# Patient Record
Sex: FEMALE | Race: WHITE | NOT HISPANIC OR LATINO | Employment: UNEMPLOYED | URBAN - METROPOLITAN AREA
[De-identification: names, ages, dates, MRNs, and addresses within clinical notes are randomized per-mention and may not be internally consistent; named-entity substitution may affect disease eponyms.]

---

## 2017-03-01 ENCOUNTER — TELEPHONE (OUTPATIENT)
Dept: NEUROLOGY | Facility: CLINIC | Age: 22
End: 2017-03-01

## 2017-03-01 ENCOUNTER — OFFICE VISIT (OUTPATIENT)
Dept: NEUROLOGY | Facility: CLINIC | Age: 22
End: 2017-03-01
Payer: MEDICAID

## 2017-03-01 VITALS
RESPIRATION RATE: 18 BRPM | HEIGHT: 63 IN | DIASTOLIC BLOOD PRESSURE: 70 MMHG | SYSTOLIC BLOOD PRESSURE: 129 MMHG | BODY MASS INDEX: 27.23 KG/M2 | HEART RATE: 102 BPM | WEIGHT: 153.69 LBS

## 2017-03-01 DIAGNOSIS — R56.9 SEIZURE: Primary | ICD-10-CM

## 2017-03-01 DIAGNOSIS — R79.89 ABNORMAL LFTS: ICD-10-CM

## 2017-03-01 PROCEDURE — 99999 PR PBB SHADOW E&M-EST. PATIENT-LVL III: CPT | Mod: PBBFAC,,, | Performed by: PSYCHIATRY & NEUROLOGY

## 2017-03-01 PROCEDURE — 99215 OFFICE O/P EST HI 40 MIN: CPT | Mod: S$PBB,,, | Performed by: PSYCHIATRY & NEUROLOGY

## 2017-03-01 PROCEDURE — 99213 OFFICE O/P EST LOW 20 MIN: CPT | Mod: PBBFAC,PN | Performed by: PSYCHIATRY & NEUROLOGY

## 2017-03-01 RX ORDER — MEDROXYPROGESTERONE ACETATE 150 MG/ML
INJECTION, SUSPENSION INTRAMUSCULAR
Refills: 3 | COMMUNITY
Start: 2017-01-25 | End: 2020-02-14 | Stop reason: CLARIF

## 2017-03-01 RX ORDER — LEVETIRACETAM 500 MG/1
500 TABLET ORAL 2 TIMES DAILY
Qty: 60 TABLET | Refills: 11 | Status: SHIPPED | OUTPATIENT
Start: 2017-03-01 | End: 2020-02-14 | Stop reason: CLARIF

## 2017-03-01 NOTE — MR AVS SNAPSHOT
[facility] Neurology  [address]  [facility] [address]  Phone: [phone]  Fax: [phone]                  Guadalupe Aron   3/1/2017 10:40 AM   Office Visit    Description:  Female : 1995   Provider:  Remy Quintanilla Jr., MD   Department:  [place] - Neurology           Reason for Visit     Seizures           Diagnoses this Visit        Comments    Seizure    -  Primary     Abnormal LFTs                To Do List           Future Appointments        Provider Department Dept Phone    3/3/2017 9:35 AM LAB, [place] [facility] [phone]      Goals (5 Years of Data)     None       These Medications        Disp Refills Start End    levetiracetam (KEPPRA) 500 MG Tab 60 tablet 11 3/1/2017     Take 1 tablet (500 mg total) by mouth 2 (two) times daily. - Oral    [facility] [address] [place] [address] [facility] [address] Ph #: [phone]         [facility]     [facility] -  Assistance  Registered nurses in the [facility] provide clinical advisement, health education, appointment booking, and other advisory services.  Call for this free service at [phone].             Medications           Message regarding Medications     Verify the changes and/or additions to your medication regime listed below are the same as discussed with your clinician today.  If any of these changes or additions are incorrect, please notify your healthcare provider.        START taking these NEW medications        Refills    levetiracetam (KEPPRA) 500 MG Tab 11    Sig: Take 1 tablet (500 mg total) by mouth 2 (two) times daily.    Class: Normal    Route: Oral      STOP taking these medications     valproic acid (DEPAKENE) 250 mg capsule Take 2 capsules (500 mg total) by mouth 2 (two) times daily.           Verify that the below list of medications is an accurate representation of the medications you are  currently taking.  If none reported, the list may be blank. If incorrect, please contact your healthcare provider. Carry this list with you in case of emergency.           Current Medications     medroxyPROGESTERone (DEPO-PROVERA) 150 mg/mL Syrg ADM 1 ML IM 1 TIME Q 3 MONTHS    nicotine (NICODERM CQ) 21 mg/24 hr Place 1 patch onto the skin once daily.    levetiracetam (KEPPRA) 500 MG Tab Take 1 tablet (500 mg total) by mouth 2 (two) times daily.           Clinical Reference Information           Your Vitals Were     BP                   129/70 (BP Location: Left arm, Patient Position: Sitting, BP Method: Automatic)           Blood Pressure          Most Recent Value    BP  129/70      Allergies as of 3/1/2017     No Known Allergies      Immunizations Administered on Date of Encounter - 3/1/2017     None      Orders Placed During Today's Visit     Future Labs/Procedures Expected by Expires    CBC auto differential  3/1/2017 4/30/2018    Comprehensive metabolic panel  3/1/2017 4/30/2018      [facility] Sign-Up     Activating your [facility] account is as easy as 1-2-3!     1) Visit [email or web address], select Sign Up Now, enter this activation code and your date of birth, then select Next.  [email or web address]  Expires: 4/15/2017 10:25 AM      2) Create a username and password to use when you visit [facility] in the future and select a security question in case you lose your password and select Next.    3) Enter your e-mail address and click Sign Up!    Additional Information  If you have questions, please e-mail [email or web address] or call [phone] to talk to our [facility] staff. Remember, [facility] is NOT to be used for urgent needs. For medical emergencies, dial 911.         Smoking Cessation     If you would like to quit smoking:   You may be eligible for free services if you are a [place] resident and started smoking cigarettes before September 1, 1988.  Call the Smoking Cessation Trust (SCT) toll free at (063)  687-2559 or (801) 605-7051.   Call 1-800-QUIT-NOW if you do not meet the above criteria.            Language Assistance Services     ATTENTION: Language assistance services are available, free of charge. Please call [phone].      ATENCIÓN: Si habla dignaañol, tiene a quijano disposición servicios gratuitos de asistencia lingüística. Llame al [phone].     CHÚ Ý: N?u b?n nói Ti?ng Vi?t, có các d?ch v? h? tr? ngôn ng? mi?n phí dành cho b?n. G?i s? [phone].         [facility] complies with applicable Federal civil rights laws and does not discriminate on the basis of race, color, national origin, age, disability, or sex.

## 2017-03-01 NOTE — PROGRESS NOTES
"Date of service:  3/1/2017    Chief complaint:  Seizures    History of present illness:  The patient is a 21 y.o. female seen in the hospital by Dr. Solis for seizures.  This is my first time seeing her.      The patient herself has no memory of the event.  The patient's seizures occurred in late December (9 PM on  and 4 AM ).  She reports no aura.  She was told that she had generalized shaking with unresponsiveness.  It is not clear how long this lasted for.  Afterwards, she reports that she did not wake up for a period of time.  The patient has had 2 such events.    She reports that she has had episodes where she stares and is apparently unresponsive for a few seconds. These happen daily.    She also indicates that she has brief monophasic jerks.  She has not noticed that there is any particular predilection for a given time of the day in which these occur.  These also happen daily.     The patient has no known family history of seizures; however, she is adopted and does not know anything of her biological family's medical history.  She reports no known history of prenatal/ complications. There is no known history of febrile seizures.  She notes no history of CNS infections. She claims a history of significant head trauma resulting in a skull fracture and a "bruise on my brain."  There is no history of developmental delay.    Also of note, she is moving to [place] in less than a week.    Current AEDs:  None    Prior AEDs:  Depakote (stopped without discussing with a physician due to insomnia and hair loss)      Past Medical History:   Diagnosis Date    Seizures        Past surgical History:  Denies     Family History:  Adopted, no knowledge of biological family      Social History     Social History    Marital status: Single     Spouse name: N/A    Number of children: N/A    Years of education: N/A     Occupational History    Not on file.     Social History Main Topics    Smoking status: " "Current Every Day Smoker     Packs/day: 0.50     Types: Cigarettes    Smokeless tobacco: Not on file    Alcohol use No    Drug use: Yes     Special: Marijuana    Sexual activity: Not on file     Other Topics Concern    Not on file     Social History Narrative        Review of Systems  General/Constitutional:  No unintentional weight loss, + change in appetite  Eyes/Vision:  No change in vision, No double vision  ENT:  No frequent nose bleeds, + ringing in the ears  Respiratory:  No cough, No wheezing  Cardiovascular:  No chest pain, No palpitations  Gastrointestinal:  No jaundice, No nausea/vomiting  Genitourinary:  No incontinence, No burning with urination  Hematologic/Lymphatic:  + easy bruising/bleeding, No night sweats  Neurological:  No numbness, No weakness  Endocrine:  No fatigue, No heat/cold intolerance  Allergy/Immunologic:  No fevers, No chills  Musculoskeletal:  No muscle pain, No joint pain   Psychiatric:  No thoughts of harming self/others, No depression  Integumentary:  No rashes, No sores that do not heal     Physical exam:  /70 (BP Location: Left arm, Patient Position: Sitting, BP Method: Automatic)  Pulse 102  Resp 18  Ht 5' 3" (1.6 m)  Wt 69.7 kg (153 lb 10.6 oz)  LMP 03/01/2017  BMI 27.22 kg/m2  General: Well developed, well nourished.  No acute distress.  HEENT: Atraumatic, normocephalic.  Neck: Supple, trachea midline.  Cardiovascular: Regular rate and rhythm.  Pulmonary: No increased work of breathing.  Abdomen/GI: No guarding.  Musculoskeletal: No obvious joint deformities, moves all extremities well.    Neurological exam:  Mental status: Awake and alert.  Oriented x4.  Speech fluent and appropriate.  Recent and remote memory appear to be intact.  Fund of knowledge normal.  Cranial nerves: Pupils equal round and reactive to light, extraocular movements intact, facial strength and sensation intact bilaterally, palate and tongue midline, hearing grossly intact " "bilaterally.  Motor: 5 out of 5 strength throughout the upper and lower extremities bilaterally. Normal bulk and tone.  Sensation: Intact to light touch and temperature bilaterally.  DTR: 2+ at the knees and biceps bilaterally.  Coordination: Finger-nose-finger testing intact bilaterally.  Gait: Normal gait. Good tandem.    Data base:  Notes from her hospitalization were reviewed.  Briefly summarized, these indicated that Dr. Solis suspected TOM.  He started the patient on Depakote, as he felt this was the most appropriate medication for her diagnosis.  It appears he did discuss the significant risks related to fetal development associated with this medication.    Labs (12/16):  CMP: AST=53, ALT=61  CBC: WBC=13.2, HCT=33, tsx=582    CT head (12/16):  "Normal exam."  I independently visualized and interpreted this study.     Assessment and plan:  The patient is a 21 y.o. female referred for evaluation of seizures.  Question has been raised of TOM, and it is certainly a reasonable item to have on the differential. I think an appropriate workup at this point would include MRI of the brain and EEG.  The patient, though, is moving to [place] in 6 days.  Consequently, she will not be able to follow up with me, and it is highly unlikely we could get this testing performed prior to her leaving.  I have provided her with a list of comprehensive epilepsy centers in [place] and instructed her to see about establishing care at one of these institutions.  Given that she is about to fly across country and will be living in a different state shortly, I do think that the risk-benefit profile favors having the patient on a medication while the workup is ongoing.  We will start her on Keppra 500 mg BID. Medication side effects were discussed with the patient.  Teratogenicity of AEDs were discussed.  She was instructed to make sure she was utilizing a reliable means of birth control.  She indicated she is on Depo-Provera.  Should she " decide to become pregnant, she is to contact her neurologist in [place] first, so they can determine what medication adjustments, if any, are appropriate and initiate appropriate monitoring during pregnancy.  The patient was counseled that the risks posed by uncontrolled seizures typically exceeds that posed by the medications themselves. Consequently, the patient was advised of the importance of continuing her anticonvulsant medication should she become pregnant.  She was advised to take supplemental folate.  [place] state law as it pertains to driving for individuals with seizures was discussed.  I have encouraged her to find out from her neurologist in [place] what the relevant state laws are there.  Searching online seems to indicate that there is no fixed time period of seizure freedom required and that physicians are not required to report to the state.  The patient was also counseled on seizure safety. We will not plan on seeing the patient in follow up, unless she ultimately does not move.  She has, though, been encouraged to establish care in [place], and I have supplied her with a list of programs that should bring to bear more than adequate expertise to ensure appropriate diagnosis and management of her seizure disorder.  I have asked that she contact our office when she has an appointment with a neurologist in [place], so we can forward them our records.    Also of note, the patient did have a mild transaminitis during her hospitalization.  The etiology of this is not entirely clear.  Given that the patient was on Depakote until recently, I would like to repeat labs just to be certain that her LFTs have not worsened on the drug.  I have advised her to establish care with a PCP when she moves and to follow with them for this issue.

## 2017-12-29 ENCOUNTER — TELEPHONE (OUTPATIENT)
Dept: ORTHOPEDICS | Facility: CLINIC | Age: 22
End: 2017-12-29

## 2017-12-29 NOTE — TELEPHONE ENCOUNTER
Spoke with pt and advised that she will need to be seen before getting excuse for work. Pt scheduled for 1/4.

## 2017-12-29 NOTE — TELEPHONE ENCOUNTER
----- Message from Gaby Oconnor sent at 12/29/2017 11:15 AM CST -----  Contact: Self  Calling to get an excuse for work her visit on Thursday, 12/28.  Please call.

## 2018-01-04 ENCOUNTER — OFFICE VISIT (OUTPATIENT)
Dept: ORTHOPEDICS | Facility: CLINIC | Age: 23
End: 2018-01-04
Payer: MEDICAID

## 2018-01-04 VITALS — WEIGHT: 143 LBS | HEIGHT: 63 IN | BODY MASS INDEX: 25.34 KG/M2

## 2018-01-04 DIAGNOSIS — M25.562 ACUTE PAIN OF LEFT KNEE: Primary | ICD-10-CM

## 2018-01-04 DIAGNOSIS — S89.92XA INJURY OF LEFT KNEE, INITIAL ENCOUNTER: ICD-10-CM

## 2018-01-04 PROCEDURE — 99212 OFFICE O/P EST SF 10 MIN: CPT | Mod: PBBFAC,PN | Performed by: ORTHOPAEDIC SURGERY

## 2018-01-04 PROCEDURE — 99999 PR PBB SHADOW E&M-EST. PATIENT-LVL II: CPT | Mod: PBBFAC,,, | Performed by: ORTHOPAEDIC SURGERY

## 2018-01-04 PROCEDURE — 99203 OFFICE O/P NEW LOW 30 MIN: CPT | Mod: S$PBB,,, | Performed by: ORTHOPAEDIC SURGERY

## 2018-01-04 RX ORDER — NAPROXEN 500 MG/1
TABLET ORAL
Refills: 0 | COMMUNITY
Start: 2017-11-12 | End: 2020-02-14 | Stop reason: CLARIF

## 2018-01-04 RX ORDER — DOXYCYCLINE HYCLATE 100 MG
TABLET ORAL
Refills: 0 | COMMUNITY
Start: 2017-10-11 | End: 2020-02-12

## 2018-01-04 NOTE — PROGRESS NOTES
Guadalupe Sharp, 22 years old, injured his left knee a couple of weeks ago, fell on   some stairs, felt a pop in her knee.  It has been painful since then.  Been   using brace and crutches, which has helped, went to the Emergency Room initially   got x-rays, which were negative.    PHYSICAL EXAMINATION:  Today shows positive patellar apprehension.  Lachman   testing is equivocal.  Skin is intact.  Compartments are soft.    X-rays are negative.    ASSESSMENT:  Knee sprain, probable MPFL disruption.    PLAN:  We will get an MRI of her knee, see her back after that to discuss   different treatment options.      PBB/HN  dd: 01/04/2018 14:22:42 (CST)  td: 01/05/2018 03:24:18 (CST)  Doc ID   #[id number]  Job ID #[id number]    CC:     Further History  Aching pain  Worse with activity  Relieved with rest  No other associated symptoms  No other radiation    Further Exam  Alert and oriented  Pleasant  Contralateral limb has appropriate range of motion for age and condition  Contralateral limb has appropriate strength for age and condition  Contralateral limb has appropriate stability  for age and condition  No adenopathy  Pulses are appropriate for current condition  Skin is intact        Chief Complaint    Chief Complaint   Patient presents with    Left Knee - Pain       HPI  Guadalupe Sharp is a 22 y.o.  female who presents with       Past Medical History  Past Medical History:   Diagnosis Date    Seizures        Past Surgical History  History reviewed. No pertinent surgical history.    Medications  Current Outpatient Prescriptions   Medication Sig    doxycycline (VIBRA-TABS) 100 MG tablet TK 1 T PO BID    levetiracetam (KEPPRA) 500 MG Tab Take 1 tablet (500 mg total) by mouth 2 (two) times daily.    medroxyPROGESTERone (DEPO-PROVERA) 150 mg/mL Syrg ADM 1 ML IM 1 TIME Q 3 MONTHS    meloxicam (MOBIC) 7.5 MG tablet Take 1 tablet (7.5 mg total) by mouth once daily.    naproxen (NAPROSYN) 500 MG tablet TK 1 T PO BID PRN FOR PAIN. TAKE  WITH FOOD    nicotine (NICODERM CQ) 21 mg/24 hr Place 1 patch onto the skin once daily.     No current facility-administered medications for this visit.        Allergies  Review of patient's allergies indicates:   Allergen Reactions    Latex, natural rubber        Family History  History reviewed. No pertinent family history.    Social History  Social History     Social History    Marital status: Single     Spouse name: N/A    Number of children: N/A    Years of education: N/A     Occupational History    Not on file.     Social History Main Topics    Smoking status: Current Every Day Smoker     Packs/day: 0.50     Types: Cigarettes    Smokeless tobacco: Never Used    Alcohol use No    Drug use: Yes     Types: Marijuana    Sexual activity: Not on file     Other Topics Concern    Not on file     Social History Narrative    No narrative on file               Review of Systems     Constitutional: Negative    HENT: Negative  Eyes: Negative  Respiratory: Negative  Cardiovascular: Negative  Musculoskeletal: HPI  Skin: Negative  Neurological: Negative  Hematological: Negative  Endocrine: Negative                 Physical Exam    There were no vitals filed for this visit.  Body mass index is 25.33 kg/m².  Physical Examination:     General appearance -  well appearing, and in no distress  Mental status - awake  Neck - supple  Chest -  symmetric air entry  Heart - normal rate   Abdomen - soft      Assessment     1. Acute pain of left knee          Plan

## 2019-10-02 ENCOUNTER — TELEPHONE (OUTPATIENT)
Dept: NEUROLOGY | Facility: CLINIC | Age: 24
End: 2019-10-02

## 2019-10-02 NOTE — TELEPHONE ENCOUNTER
----- Message from Toño Abdullahi sent at 10/2/2019 12:19 PM CDT -----  Contact: pt  Type:  Sooner Apoointment Request    Caller is requesting a sooner appointment.  Caller declined first available appointment listed below.  Caller will not accept being placed on the waitlist and is requesting a message be sent to doctor.    Name of Caller:  pt  When is the first available appointment?  Epic searched and then did not give a date.  Symptoms:  pt has been having seziures more frequently.   Best Call Back Number:  [phone]  Additional Information:  Pt would like to come in and discuss medications. Pt last seen by Dr. Quintanilla in 2017.

## 2020-02-14 PROBLEM — N73.0 PID (ACUTE PELVIC INFLAMMATORY DISEASE): Status: ACTIVE | Noted: 2020-02-14

## 2020-02-16 PROBLEM — K59.00 CONSTIPATION: Status: ACTIVE | Noted: 2020-02-16

## 2020-06-15 ENCOUNTER — CLINICAL SUPPORT (OUTPATIENT)
Dept: URGENT CARE | Facility: CLINIC | Age: 25
End: 2020-06-15

## 2020-06-15 PROCEDURE — U0003 INFECTIOUS AGENT DETECTION BY NUCLEIC ACID (DNA OR RNA); SEVERE ACUTE RESPIRATORY SYNDROME CORONAVIRUS 2 (SARS-COV-2) (CORONAVIRUS DISEASE [COVID-19]), AMPLIFIED PROBE TECHNIQUE, MAKING USE OF HIGH THROUGHPUT TECHNOLOGIES AS DESCRIBED BY CMS-2020-01-R: HCPCS

## 2020-06-16 PROBLEM — O02.1 ABORTION, MISSED: Status: ACTIVE | Noted: 2020-06-16

## 2020-06-17 LAB — SARS-COV-2 RNA RESP QL NAA+PROBE: NOT DETECTED

## 2021-05-04 ENCOUNTER — PATIENT MESSAGE (OUTPATIENT)
Dept: RESEARCH | Facility: HOSPITAL | Age: 26
End: 2021-05-04

## 2021-07-10 PROBLEM — Z86.69 HX OF ABSENCE SEIZURES: Status: ACTIVE | Noted: 2021-07-10

## 2021-07-15 ENCOUNTER — LAB VISIT (OUTPATIENT)
Dept: LAB | Facility: HOSPITAL | Age: 26
End: 2021-07-15
Attending: OBSTETRICS & GYNECOLOGY
Payer: MEDICAID

## 2021-07-15 DIAGNOSIS — G40.909: ICD-10-CM

## 2021-07-15 DIAGNOSIS — O99.352: ICD-10-CM

## 2021-07-15 PROCEDURE — 80177 DRUG SCRN QUAN LEVETIRACETAM: CPT | Performed by: OBSTETRICS & GYNECOLOGY

## 2021-07-15 PROCEDURE — 36415 COLL VENOUS BLD VENIPUNCTURE: CPT | Mod: PO | Performed by: OBSTETRICS & GYNECOLOGY

## 2021-07-19 LAB — LEVETIRACETAM SERPL-MCNC: 13.9 UG/ML (ref 3–60)

## 2021-09-02 PROBLEM — O47.00 PRETERM UTERINE CONTRACTIONS: Status: ACTIVE | Noted: 2021-09-02

## 2021-09-08 PROBLEM — R51.9 HEADACHE IN PREGNANCY: Status: ACTIVE | Noted: 2021-09-08

## 2021-09-08 PROBLEM — O26.899 HEADACHE IN PREGNANCY: Status: ACTIVE | Noted: 2021-09-08

## 2021-09-29 PROBLEM — R10.9 ABDOMINAL CRAMPING AFFECTING PREGNANCY: Status: ACTIVE | Noted: 2021-09-29

## 2021-09-29 PROBLEM — O26.899 ABDOMINAL CRAMPING AFFECTING PREGNANCY: Status: ACTIVE | Noted: 2021-09-29

## 2021-10-07 PROBLEM — O36.8190 DECREASED FETAL MOVEMENT: Status: ACTIVE | Noted: 2021-10-07
